# Patient Record
Sex: FEMALE
[De-identification: names, ages, dates, MRNs, and addresses within clinical notes are randomized per-mention and may not be internally consistent; named-entity substitution may affect disease eponyms.]

---

## 2020-02-21 ENCOUNTER — NURSE TRIAGE (OUTPATIENT)
Dept: OTHER | Facility: CLINIC | Age: 30
End: 2020-02-21

## 2020-02-21 NOTE — TELEPHONE ENCOUNTER
Reason for Disposition   Swollen foot (EXCEPTIONs: localized bump from bunions, calluses, insect bite, sting)    Protocols used: FOOT PAIN-ADULT-[place]    Pt calling for MMO benefit. She states for the last 2 weeks she has had pain in her L foot. She states there is swelling on the top of the foot and discoloration in the ankle. No specific injury that she can think of. She states the pain is 8/10. Triage indicates for pt to be seen today. Recommended to pt to see PCP or seek treatment at a clinic or [facility]. Pt instructed to call back for any new or worsening symptoms. Patient verbalized understanding. Patient denies any other questions or concerns. Please do not respond to the triage nurse through this encounter. Any subsequent communication should be directly with the patient.